# Patient Record
Sex: MALE | Race: ASIAN | NOT HISPANIC OR LATINO | Employment: PART TIME | URBAN - METROPOLITAN AREA
[De-identification: names, ages, dates, MRNs, and addresses within clinical notes are randomized per-mention and may not be internally consistent; named-entity substitution may affect disease eponyms.]

---

## 2023-05-22 ENCOUNTER — TRANSFERRED RECORDS (OUTPATIENT)
Dept: HEALTH INFORMATION MANAGEMENT | Facility: CLINIC | Age: 28
End: 2023-05-22

## 2023-05-22 LAB
ALT SERPL-CCNC: 98 U/L (ref 16–63)
AST SERPL-CCNC: 44 U/L (ref 0–55)
CREATININE (EXTERNAL): 1.03 MG/DL (ref 0.7–1.3)
GFR ESTIMATED (EXTERNAL): >60 ML/MIN/1.73M2
GLUCOSE (EXTERNAL): 177 MG/DL (ref 70–124)
POTASSIUM (EXTERNAL): 4.1 MMOL/L (ref 3.4–5.3)
TSH SERPL-ACNC: 2.29 MIU/L (ref 0.4–4.5)

## 2023-05-30 ENCOUNTER — TRANSFERRED RECORDS (OUTPATIENT)
Dept: HEALTH INFORMATION MANAGEMENT | Facility: CLINIC | Age: 28
End: 2023-05-30

## 2023-05-30 LAB — HBA1C MFR BLD: 5.8 % (ref 4.3–5.6)

## 2023-06-02 ENCOUNTER — TRANSCRIBE ORDERS (OUTPATIENT)
Dept: OTHER | Age: 28
End: 2023-06-02

## 2023-06-02 DIAGNOSIS — R35.0 URINARY FREQUENCY: Primary | ICD-10-CM

## 2023-06-06 ENCOUNTER — ANCILLARY PROCEDURE (OUTPATIENT)
Dept: ULTRASOUND IMAGING | Facility: CLINIC | Age: 28
End: 2023-06-06
Attending: FAMILY MEDICINE
Payer: COMMERCIAL

## 2023-06-06 DIAGNOSIS — R35.0 URINARY FREQUENCY: ICD-10-CM

## 2023-06-06 PROCEDURE — 76700 US EXAM ABDOM COMPLETE: CPT | Mod: GC | Performed by: RADIOLOGY

## 2023-06-24 NOTE — TELEPHONE ENCOUNTER
Action June 23, 2023 JTV 10:30 PM    Action Taken CSS sent an urgent request to [facility] for records.      MEDICAL RECORDS REQUEST   [facility]  [facility]  [address] [place] [id number]  PHONE: [phone]  Fax: [phone]        FUTURE VISIT INFORMATION                                                     APPOINTMENT INFORMATION:    Date: 08/24/2023    Provider:  Percy Flores PA-C    Reason for Visit/Diagnosis: Urinary Frequency    REFERRAL INFORMATION:    Referring provider:  Frederick Flores MD @ [facility] contact number:  Phone: [phone] Fax: [phone]    RECORDS REQUESTED FOR VISIT                                                     NOTES  STATUS/DETAILS   OFFICE NOTE from referring provider Media tab Yes, 05/22/2023, 05/30/2023 -- Frederick Flores MD @ [facility]   MEDICATION LIST  no   LABS     URINALYSIS (UA)  YES     PRE-VISIT CHECKLIST      Joint diagnostic appointment coordinated correctly          (ensure right order & amount of time) Yes   RECORD COLLECTION COMPLETE yes

## 2023-07-20 ENCOUNTER — PRE VISIT (OUTPATIENT)
Dept: UROLOGY | Facility: CLINIC | Age: 28
End: 2023-07-20

## 2023-08-13 ENCOUNTER — HEALTH MAINTENANCE LETTER (OUTPATIENT)
Age: 28
End: 2023-08-13

## 2024-10-06 ENCOUNTER — HEALTH MAINTENANCE LETTER (OUTPATIENT)
Age: 29
End: 2024-10-06